# Patient Record
Sex: FEMALE | Race: ASIAN | NOT HISPANIC OR LATINO | Employment: FULL TIME | URBAN - METROPOLITAN AREA
[De-identification: names, ages, dates, MRNs, and addresses within clinical notes are randomized per-mention and may not be internally consistent; named-entity substitution may affect disease eponyms.]

---

## 2024-01-09 ENCOUNTER — OFFICE VISIT (OUTPATIENT)
Dept: URGENT CARE | Facility: URGENT CARE | Age: 29
End: 2024-01-09
Payer: COMMERCIAL

## 2024-01-09 VITALS
WEIGHT: 106 LBS | HEART RATE: 89 BPM | DIASTOLIC BLOOD PRESSURE: 75 MMHG | SYSTOLIC BLOOD PRESSURE: 118 MMHG | RESPIRATION RATE: 16 BRPM | TEMPERATURE: 98.9 F | OXYGEN SATURATION: 100 %

## 2024-01-09 DIAGNOSIS — J01.90 ACUTE SINUSITIS WITH SYMPTOMS > 10 DAYS: Primary | ICD-10-CM

## 2024-01-09 PROCEDURE — 99203 OFFICE O/P NEW LOW 30 MIN: CPT | Performed by: PHYSICIAN ASSISTANT

## 2024-01-09 NOTE — PATIENT INSTRUCTIONS
January 9, 2024 Urgent Care Plan:     -Augmentin antibiotic   -Follow-up with your primary care provider if you do not have any improvement in 5 days, if you are not all better in 10 days, and sooner if you have any worsening

## 2024-01-09 NOTE — PROGRESS NOTES
ASSESSMENT/PLAN:     (J01.90) Acute sinusitis with symptoms > 10 days  (primary encounter diagnosis)  MDM: Viral URI with secondary bacterial sinusitis.  Plan: amoxicillin-clavulanate (AUGMENTIN) 875-125 MG         tablet          AVS/PLAN:     January 9, 2024 Urgent Care Plan:     -Augmentin antibiotic   -Follow-up with your primary care provider if you do not have any improvement in 5 days, if you are not all better in 10 days, and sooner if you have any worsening     This progress note has been dictated, with use of voice recognition software. Any grammatical, typographical, or context errors are unintentional and inherent to use of voice recognition software.  ---------------------------      Chief Complaint   Patient presents with    Urgent Care     Patient states she has had a headache since christmas after having a cold            SUBJECTIVE:   Candi Meza presents to clinic today for evaluation of sinus distribution headache x approximately 1.5 to 2 weeks duration.     HPI: Patient reports she developed a viral cough cold, with lots of nasal congestion, around [place].  Patient reports cough resolved but sinus and nasal congestion persist.  She reports progressive congestion and progressive sinus distribution headache since that time (points to her frontal sinuses bilaterally).        ROS: No associated fever, chills, cough, shortness of breath, wheezing, sore throat, abdominal pain, nausea, vomiting, diarrhea, body aches, rashes, joint swelling or other acute illness symptoms.  No history of migraine or tension headaches.  No visual or speech disturbance.  No acute balance disturbance.  No acute upper extremity or lower extremity numbness/tingling/weakness.      No past medical history on file.    There is no problem list on file for this patient.      No current outpatient medications on file.     No current facility-administered medications for this visit.       No Known Allergies          OBJECTIVE:   /75   Pulse 89   Temp 98.9  F (37.2  C) (Oral)   Resp 16   Wt 48.1 kg (106 lb)   LMP 12/31/2023 (Approximate)   SpO2 100%       General appearance: alert and no apparent distress   Skin color is uniform in color and without rash.   HEENT:   Conjunctiva not injected. Sclera clear.   Left TM is normal: no effusions, no erythema, and normal landmarks.   Right TM is normal: no effusions, no erythema, and normal landmarks.   Nasal mucosa is red and swollen.  Frontal sinuses are tender to finger tap percussion bilaterally.  Oropharyngeal exam is normal other than evidence of post-nasal drip: no lesions, erythema, adenopathy or exudate. Uvula is midline. No trismus.  Neck is supple, full range of motion. No adenopathy. No lateral neck swelling.   CARDIAC:NORMAL - regular rate and rhythm without murmur.   RESP: Normal - CTA without rales, rhonchi, or wheezing.   NEURO: PERRLA. Alert and oriented.  Normal speech and mentation.  CN II/XII grossly intact.  Gait within normal limits.